# Patient Record
Sex: MALE | Race: BLACK OR AFRICAN AMERICAN | NOT HISPANIC OR LATINO | Employment: FULL TIME | URBAN - METROPOLITAN AREA
[De-identification: names, ages, dates, MRNs, and addresses within clinical notes are randomized per-mention and may not be internally consistent; named-entity substitution may affect disease eponyms.]

---

## 2022-07-22 ENCOUNTER — LAB (OUTPATIENT)
Dept: LAB | Facility: HOSPITAL | Age: 27
End: 2022-07-22

## 2022-07-22 DIAGNOSIS — J03.90 EXUDATIVE TONSILLITIS: ICD-10-CM

## 2022-07-22 PROCEDURE — 86665 EPSTEIN-BARR CAPSID VCA: CPT

## 2022-07-22 PROCEDURE — 36415 COLL VENOUS BLD VENIPUNCTURE: CPT

## 2022-07-22 PROCEDURE — 86663 EPSTEIN-BARR ANTIBODY: CPT

## 2022-07-22 PROCEDURE — 86664 EPSTEIN-BARR NUCLEAR ANTIGEN: CPT

## 2022-07-25 LAB
EBV EARLY AG: POSITIVE
EBV NUCLEAR AG: POSITIVE
EBV VCA IGG: POSITIVE
EBV VCA IGM: NEGATIVE

## 2022-07-27 NOTE — PROGRESS NOTES
This result suggests that it's mononucleosis.  No specific treatment.  FU PCP.  RTC if worsening or other concerns.